# Patient Record
Sex: FEMALE | Race: WHITE | NOT HISPANIC OR LATINO | Employment: OTHER | URBAN - NONMETROPOLITAN AREA
[De-identification: names, ages, dates, MRNs, and addresses within clinical notes are randomized per-mention and may not be internally consistent; named-entity substitution may affect disease eponyms.]

---

## 2020-01-30 ENCOUNTER — APPOINTMENT (EMERGENCY)
Dept: RADIOLOGY | Facility: HOSPITAL | Age: 70
End: 2020-01-30
Payer: MEDICARE

## 2020-01-30 ENCOUNTER — HOSPITAL ENCOUNTER (EMERGENCY)
Facility: HOSPITAL | Age: 70
Discharge: HOME | End: 2020-01-30
Attending: EMERGENCY MEDICINE | Admitting: EMERGENCY MEDICINE
Payer: MEDICARE

## 2020-01-30 ENCOUNTER — APPOINTMENT (EMERGENCY)
Dept: CT IMAGING | Facility: HOSPITAL | Age: 70
End: 2020-01-30
Payer: MEDICARE

## 2020-01-30 VITALS
OXYGEN SATURATION: 99 % | SYSTOLIC BLOOD PRESSURE: 160 MMHG | RESPIRATION RATE: 18 BRPM | HEIGHT: 63 IN | WEIGHT: 202 LBS | HEART RATE: 85 BPM | TEMPERATURE: 97.8 F | DIASTOLIC BLOOD PRESSURE: 80 MMHG | BODY MASS INDEX: 35.79 KG/M2

## 2020-01-30 DIAGNOSIS — N39.0 UTI (URINARY TRACT INFECTION): ICD-10-CM

## 2020-01-30 DIAGNOSIS — M54.9 BACK PAIN: Primary | ICD-10-CM

## 2020-01-30 LAB
ALBUMIN SERPL BCP-MCNC: 3.5 G/DL (ref 3.5–5)
ALP SERPL-CCNC: 82 U/L (ref 46–116)
ALT SERPL W P-5'-P-CCNC: 22 U/L (ref 12–78)
ANION GAP SERPL CALCULATED.3IONS-SCNC: 9 MMOL/L (ref 4–13)
AST SERPL W P-5'-P-CCNC: 26 U/L (ref 5–45)
BACTERIA UR QL AUTO: ABNORMAL /HPF
BASOPHILS # BLD AUTO: 0.09 THOUSANDS/ΜL (ref 0–0.1)
BASOPHILS NFR BLD AUTO: 1 % (ref 0–1)
BILIRUB SERPL-MCNC: 0.54 MG/DL (ref 0.2–1)
BILIRUB UR QL STRIP: NEGATIVE
BUN SERPL-MCNC: 21 MG/DL (ref 5–25)
CALCIUM SERPL-MCNC: 8.6 MG/DL (ref 8.3–10.1)
CHLORIDE SERPL-SCNC: 103 MMOL/L (ref 100–108)
CLARITY UR: ABNORMAL
CO2 SERPL-SCNC: 26 MMOL/L (ref 21–32)
COLOR UR: YELLOW
CREAT SERPL-MCNC: 1.26 MG/DL (ref 0.6–1.3)
EOSINOPHIL # BLD AUTO: 0.43 THOUSAND/ΜL (ref 0–0.61)
EOSINOPHIL NFR BLD AUTO: 4 % (ref 0–6)
ERYTHROCYTE [DISTWIDTH] IN BLOOD BY AUTOMATED COUNT: 13.2 % (ref 11.6–15.1)
GFR SERPL CREATININE-BSD FRML MDRD: 44 ML/MIN/1.73SQ M
GLUCOSE SERPL-MCNC: 94 MG/DL (ref 65–140)
GLUCOSE UR STRIP-MCNC: NEGATIVE MG/DL
HCT VFR BLD AUTO: 43 % (ref 34.8–46.1)
HGB BLD-MCNC: 14.4 G/DL (ref 11.5–15.4)
HGB UR QL STRIP.AUTO: ABNORMAL
IMM GRANULOCYTES # BLD AUTO: 0.05 THOUSAND/UL (ref 0–0.2)
IMM GRANULOCYTES NFR BLD AUTO: 0 % (ref 0–2)
KETONES UR STRIP-MCNC: NEGATIVE MG/DL
LEUKOCYTE ESTERASE UR QL STRIP: ABNORMAL
LYMPHOCYTES # BLD AUTO: 3.17 THOUSANDS/ΜL (ref 0.6–4.47)
LYMPHOCYTES NFR BLD AUTO: 27 % (ref 14–44)
MCH RBC QN AUTO: 29.1 PG (ref 26.8–34.3)
MCHC RBC AUTO-ENTMCNC: 33.5 G/DL (ref 31.4–37.4)
MCV RBC AUTO: 87 FL (ref 82–98)
MONOCYTES # BLD AUTO: 0.93 THOUSAND/ΜL (ref 0.17–1.22)
MONOCYTES NFR BLD AUTO: 8 % (ref 4–12)
NEUTROPHILS # BLD AUTO: 7.05 THOUSANDS/ΜL (ref 1.85–7.62)
NEUTS SEG NFR BLD AUTO: 60 % (ref 43–75)
NITRITE UR QL STRIP: NEGATIVE
NON-SQ EPI CELLS URNS QL MICRO: ABNORMAL /HPF
NRBC BLD AUTO-RTO: 0 /100 WBCS
OTHER STN SPEC: ABNORMAL
PH UR STRIP.AUTO: 6 [PH]
PLATELET # BLD AUTO: 364 THOUSANDS/UL (ref 149–390)
PMV BLD AUTO: 10.1 FL (ref 8.9–12.7)
POTASSIUM SERPL-SCNC: 4.3 MMOL/L (ref 3.5–5.3)
PROT SERPL-MCNC: 8.5 G/DL (ref 6.4–8.2)
PROT UR STRIP-MCNC: NEGATIVE MG/DL
RBC # BLD AUTO: 4.94 MILLION/UL (ref 3.81–5.12)
RBC #/AREA URNS AUTO: ABNORMAL /HPF
SODIUM SERPL-SCNC: 138 MMOL/L (ref 136–145)
SP GR UR STRIP.AUTO: 1.01 (ref 1–1.03)
UROBILINOGEN UR QL STRIP.AUTO: 0.2 E.U./DL
WBC # BLD AUTO: 11.72 THOUSAND/UL (ref 4.31–10.16)
WBC #/AREA URNS AUTO: ABNORMAL /HPF

## 2020-01-30 PROCEDURE — 99284 EMERGENCY DEPT VISIT MOD MDM: CPT

## 2020-01-30 PROCEDURE — 87086 URINE CULTURE/COLONY COUNT: CPT | Performed by: EMERGENCY MEDICINE

## 2020-01-30 PROCEDURE — 81001 URINALYSIS AUTO W/SCOPE: CPT | Performed by: EMERGENCY MEDICINE

## 2020-01-30 PROCEDURE — 85025 COMPLETE CBC W/AUTO DIFF WBC: CPT | Performed by: EMERGENCY MEDICINE

## 2020-01-30 PROCEDURE — 96365 THER/PROPH/DIAG IV INF INIT: CPT

## 2020-01-30 PROCEDURE — 36415 COLL VENOUS BLD VENIPUNCTURE: CPT | Performed by: EMERGENCY MEDICINE

## 2020-01-30 PROCEDURE — 96361 HYDRATE IV INFUSION ADD-ON: CPT

## 2020-01-30 PROCEDURE — 74176 CT ABD & PELVIS W/O CONTRAST: CPT

## 2020-01-30 PROCEDURE — 99284 EMERGENCY DEPT VISIT MOD MDM: CPT | Performed by: EMERGENCY MEDICINE

## 2020-01-30 PROCEDURE — 71046 X-RAY EXAM CHEST 2 VIEWS: CPT

## 2020-01-30 PROCEDURE — 80053 COMPREHEN METABOLIC PANEL: CPT | Performed by: EMERGENCY MEDICINE

## 2020-01-30 PROCEDURE — 96375 TX/PRO/DX INJ NEW DRUG ADDON: CPT

## 2020-01-30 RX ORDER — ONDANSETRON 2 MG/ML
4 INJECTION INTRAMUSCULAR; INTRAVENOUS ONCE
Status: COMPLETED | OUTPATIENT
Start: 2020-01-30 | End: 2020-01-30

## 2020-01-30 RX ORDER — LISINOPRIL 5 MG/1
5 TABLET ORAL DAILY
COMMUNITY

## 2020-01-30 RX ORDER — CEFTRIAXONE 1 G/50ML
1000 INJECTION, SOLUTION INTRAVENOUS ONCE
Status: COMPLETED | OUTPATIENT
Start: 2020-01-30 | End: 2020-01-30

## 2020-01-30 RX ORDER — MORPHINE SULFATE 4 MG/ML
4 INJECTION, SOLUTION INTRAMUSCULAR; INTRAVENOUS ONCE
Status: COMPLETED | OUTPATIENT
Start: 2020-01-30 | End: 2020-01-30

## 2020-01-30 RX ORDER — CEFADROXIL 500 MG/1
500 CAPSULE ORAL EVERY 12 HOURS SCHEDULED
Qty: 20 CAPSULE | Refills: 0 | Status: SHIPPED | OUTPATIENT
Start: 2020-01-30 | End: 2020-02-09

## 2020-01-30 RX ORDER — OMEPRAZOLE 20 MG/1
20 CAPSULE, DELAYED RELEASE ORAL DAILY
COMMUNITY

## 2020-01-30 RX ORDER — KETOROLAC TROMETHAMINE 30 MG/ML
15 INJECTION, SOLUTION INTRAMUSCULAR; INTRAVENOUS ONCE
Status: COMPLETED | OUTPATIENT
Start: 2020-01-30 | End: 2020-01-30

## 2020-01-30 RX ADMIN — ONDANSETRON 4 MG: 2 INJECTION INTRAMUSCULAR; INTRAVENOUS at 21:44

## 2020-01-30 RX ADMIN — KETOROLAC TROMETHAMINE 15 MG: 30 INJECTION, SOLUTION INTRAMUSCULAR at 21:44

## 2020-01-30 RX ADMIN — CEFTRIAXONE 1000 MG: 1 INJECTION, SOLUTION INTRAVENOUS at 22:42

## 2020-01-30 RX ADMIN — MORPHINE SULFATE 4 MG: 4 INJECTION INTRAVENOUS at 22:56

## 2020-01-30 RX ADMIN — SODIUM CHLORIDE 1000 ML: 0.9 INJECTION, SOLUTION INTRAVENOUS at 21:47

## 2020-01-31 NOTE — ED PROVIDER NOTES
History  Chief Complaint   Patient presents with    Back Pain     right lower nadege pain, non-radiating ,nausea, started 3 am today, denies pain or burning with urination     Right flank discomfort 10 to weight bear around 3:00 a m  This morning and persisted, worse with movement      Flank Pain   Pain location:  R flank  Pain quality: aching    Pain radiates to:  Does not radiate  Pain severity:  Moderate  Timing:  Constant  Progression:  Unchanged  Chronicity:  New  Context: awakening from sleep    Context: not trauma    Worsened by: Movement  Associated symptoms: no chest pain, no chills, no diarrhea, no dysuria, no fatigue, no fever, no hematuria, no shortness of breath and no vomiting        Prior to Admission Medications   Prescriptions Last Dose Informant Patient Reported? Taking?   lisinopril (ZESTRIL) 5 mg tablet   Yes Yes   Sig: Take 5 mg by mouth daily   omeprazole (PriLOSEC) 20 mg delayed release capsule   Yes Yes   Sig: Take 20 mg by mouth daily      Facility-Administered Medications: None       Past Medical History:   Diagnosis Date    GERD (gastroesophageal reflux disease)     Hypertension        Past Surgical History:   Procedure Laterality Date    CHOLECYSTECTOMY         History reviewed  No pertinent family history  I have reviewed and agree with the history as documented  Social History     Tobacco Use    Smoking status: Never Smoker    Smokeless tobacco: Never Used   Substance Use Topics    Alcohol use: Never     Frequency: Never    Drug use: Never        Review of Systems   Constitutional: Negative for chills, fatigue and fever  Respiratory: Negative for shortness of breath  Cardiovascular: Negative for chest pain  Gastrointestinal: Negative for diarrhea and vomiting  Genitourinary: Positive for flank pain  Negative for dysuria and hematuria  All other systems reviewed and are negative        Physical Exam  Physical Exam   Constitutional: She is oriented to person, place, and time  She appears well-developed and well-nourished  HENT:   Head: Normocephalic and atraumatic  Mouth/Throat: Oropharynx is clear and moist    Eyes: Pupils are equal, round, and reactive to light  Conjunctivae and EOM are normal    Neck: Neck supple  Cardiovascular: Normal rate, regular rhythm and normal heart sounds  Pulmonary/Chest: Effort normal and breath sounds normal    Abdominal: Soft  Bowel sounds are normal  She exhibits no distension  There is no tenderness  Musculoskeletal: Normal range of motion  She exhibits no tenderness  Neurological: She is alert and oriented to person, place, and time  No cranial nerve deficit  Coordination normal    Skin: Skin is warm and dry  No rash noted  Psychiatric: She has a normal mood and affect  Her behavior is normal  Judgment and thought content normal    Nursing note and vitals reviewed        Vital Signs  ED Triage Vitals [01/30/20 2125]   Temperature Pulse Respirations Blood Pressure SpO2   98 °F (36 7 °C) 88 18 (!) 186/96 98 %      Temp Source Heart Rate Source Patient Position - Orthostatic VS BP Location FiO2 (%)   Oral Monitor Lying Right arm --      Pain Score       5           Vitals:    01/30/20 2125 01/30/20 2320   BP: (!) 186/96 160/80   Pulse: 88 85   Patient Position - Orthostatic VS: Lying Lying         Visual Acuity      ED Medications  Medications   sodium chloride 0 9 % bolus 1,000 mL (0 mL Intravenous Stopped 1/30/20 2319)   ondansetron (ZOFRAN) injection 4 mg (4 mg Intravenous Given 1/30/20 2144)   ketorolac (TORADOL) injection 15 mg (15 mg Intravenous Given 1/30/20 2144)   cefTRIAXone (ROCEPHIN) IVPB (premix) 1,000 mg (0 mg Intravenous Stopped 1/30/20 2319)   morphine (PF) 4 mg/mL injection 4 mg (4 mg Intravenous Given 1/30/20 2256)       Diagnostic Studies  Results Reviewed     Procedure Component Value Units Date/Time    Urine Microscopic [[id number]]  (Abnormal) Collected:  01/30/20 2147    Lab Status:  Final result Specimen: Urine, Clean Catch Updated:  01/30/20 2209     RBC, UA None Seen /hpf      WBC, UA Innumerable /hpf      Epithelial Cells Occasional /hpf      Bacteria, UA Moderate /hpf      OTHER OBSERVATIONS Glitter Cells    Urine culture [[id number]] Collected:  01/30/20 2147    Lab Status:   In process Specimen:  Urine, Clean Catch Updated:  01/30/20 2209    Comprehensive metabolic panel [[id number]]  (Abnormal) Collected:  01/30/20 2140    Lab Status:  Final result Specimen:  Blood from Arm, Right Updated:  01/30/20 2200     Sodium 138 mmol/L      Potassium 4 3 mmol/L      Chloride 103 mmol/L      CO2 26 mmol/L      ANION GAP 9 mmol/L      BUN 21 mg/dL      Creatinine 1 26 mg/dL      Glucose 94 mg/dL      Calcium 8 6 mg/dL      AST 26 U/L      ALT 22 U/L      Alkaline Phosphatase 82 U/L      Total Protein 8 5 g/dL      Albumin 3 5 g/dL      Total Bilirubin 0 54 mg/dL      eGFR 44 ml/min/1 73sq m     Narrative:       [place] guidelines for Chronic Kidney Disease (CKD):     Stage 1 with normal or high GFR (GFR > 90 mL/min/1 73 square meters)    Stage 2 Mild CKD (GFR = 60-89 mL/min/1 73 square meters)    Stage 3A Moderate CKD (GFR = 45-59 mL/min/1 73 square meters)    Stage 3B Moderate CKD (GFR = 30-44 mL/min/1 73 square meters)    Stage 4 Severe CKD (GFR = 15-29 mL/min/1 73 square meters)    Stage 5 End Stage CKD (GFR <15 mL/min/1 73 square meters)  Note: GFR calculation is accurate only with a steady state creatinine    UA w Reflex to Microscopic w Reflex to Culture [[id number]]  (Abnormal) Collected:  01/30/20 2147    Lab Status:  Final result Specimen:  Urine, Clean Catch Updated:  01/30/20 2156     Color, UA Yellow     Clarity, UA Slightly Cloudy     Specific Gravity, UA 1 015     pH, UA 6 0     Leukocytes, UA Moderate     Nitrite, UA Negative     Protein, UA Negative mg/dl      Glucose, UA Negative mg/dl      Ketones, UA Negative mg/dl      Urobilinogen, UA 0 2 E U /dl      Bilirubin, UA Negative     Blood, UA Trace-lysed    CBC and differential [[id number]]  (Abnormal) Collected:  01/30/20 2140    Lab Status:  Final result Specimen:  Blood from Arm, Right Updated:  01/30/20 2145     WBC 11 72 Thousand/uL      RBC 4 94 Million/uL      Hemoglobin 14 4 g/dL      Hematocrit 43 0 %      MCV 87 fL      MCH 29 1 pg      MCHC 33 5 g/dL      RDW 13 2 %      MPV 10 1 fL      Platelets 247 Thousands/uL      nRBC 0 /100 WBCs      Neutrophils Relative 60 %      Immat GRANS % 0 %      Lymphocytes Relative 27 %      Monocytes Relative 8 %      Eosinophils Relative 4 %      Basophils Relative 1 %      Neutrophils Absolute 7 05 Thousands/µL      Immature Grans Absolute 0 05 Thousand/uL      Lymphocytes Absolute 3 17 Thousands/µL      Monocytes Absolute 0 93 Thousand/µL      Eosinophils Absolute 0 43 Thousand/µL      Basophils Absolute 0 09 Thousands/µL                  CT abdomen pelvis wo contrast   Final Result by Jenni Scruggs MD (01/30 2232)      No acute findings in the abdomen or pelvis                 Workstation performed: [id number]         XR chest 2 views   ED Interpretation by Huma Weaver DO (01/30 2256)   NAD                 Procedures  Procedures         ED Course  ED Course as of Jan 31 0109   Thu Jan 30, 2020 2256 We discussed results, feeling better, agreeable with close outpatient follow up, will return immediately if worse or any symptoms, spouse present                                  MDM      Disposition  Final diagnoses:   Back pain   UTI (urinary tract infection)     Time reflects when diagnosis was documented in both MDM as applicable and the Disposition within this note     Time User Action Codes Description Comment    1/30/2020 10:56 PM Ursula Rosin Add [M54 9] Back pain     1/30/2020 10:56 PM Ursula Rosin Add [N39 0] UTI (urinary tract infection)       ED Disposition     ED Disposition Condition Date/Time Comment    Discharge Stable Thu Jan 30, 2020 10:56 PM Brayden Dunlap Bridges discharge to home/self care  Follow-up Information     Follow up With Specialties Details Why Contact Info    Oswaldo Mullins DO  Schedule an appointment as soon as possible for a visit in 3 days  [address] [facility]  [phone]            Discharge Medication List as of 2020 10:57 PM      START taking these medications    Details   cefadroxil (DURICEF) 500 mg capsule Take 1 capsule (500 mg total) by mouth every 12 (twelve) hours for 10 days, Starting Thu 2020, Until Sun 2020, Normal         CONTINUE these medications which have NOT CHANGED    Details   lisinopril (ZESTRIL) 5 mg tablet Take 5 mg by mouth daily, Historical Med      omeprazole (PriLOSEC) 20 mg delayed release capsule Take 20 mg by mouth daily, Historical Med           No discharge procedures on file      ED Provider  Electronically Signed by           Charla Wolff DO  [id number]

## 2020-01-31 NOTE — DISCHARGE INSTRUCTIONS
Return immediately if worse or any new symptoms  Tylenol 1000 mg every 6 hours as needed  and/or  Advil 400 mg every 6 hours as needed  May take both together

## 2020-01-31 NOTE — ED NOTES
Patient verbalized understanding of discharge instructions and states she has no further questions      Vito Huynh RN  01/30/20 2084

## 2020-02-01 LAB — BACTERIA UR CULT: NORMAL

## 2021-09-21 ENCOUNTER — HOSPITAL ENCOUNTER (OUTPATIENT)
Dept: RADIOLOGY | Facility: HOSPITAL | Age: 71
Discharge: HOME | End: 2021-09-21
Payer: MEDICARE

## 2021-09-21 DIAGNOSIS — R05.9 COUGH: ICD-10-CM

## 2021-09-21 PROCEDURE — 71046 X-RAY EXAM CHEST 2 VIEWS: CPT

## 2024-08-28 ENCOUNTER — HOSPITAL ENCOUNTER (OUTPATIENT)
Dept: RADIOLOGY | Facility: HOSPITAL | Age: 74
Discharge: HOME | End: 2024-08-28
Payer: MEDICARE

## 2024-08-28 DIAGNOSIS — R05.9 COUGH, UNSPECIFIED TYPE: ICD-10-CM

## 2024-08-28 PROCEDURE — 71046 X-RAY EXAM CHEST 2 VIEWS: CPT

## 2024-12-04 ENCOUNTER — TELEPHONE (OUTPATIENT)
Dept: PULMONOLOGY | Facility: CLINIC | Age: 74
End: 2024-12-04

## 2024-12-17 ENCOUNTER — TRANSCRIBE ORDERS (OUTPATIENT)
Dept: SLEEP CENTER | Facility: CLINIC | Age: 74
End: 2024-12-17

## 2024-12-17 ENCOUNTER — OFFICE VISIT (OUTPATIENT)
Dept: PULMONOLOGY | Facility: CLINIC | Age: 74
End: 2024-12-17
Payer: MEDICARE

## 2024-12-17 VITALS
HEART RATE: 93 BPM | WEIGHT: 224 LBS | OXYGEN SATURATION: 97 % | BODY MASS INDEX: 39.68 KG/M2 | DIASTOLIC BLOOD PRESSURE: 84 MMHG | SYSTOLIC BLOOD PRESSURE: 148 MMHG | TEMPERATURE: 97.3 F

## 2024-12-17 DIAGNOSIS — I10 PRIMARY HYPERTENSION: ICD-10-CM

## 2024-12-17 DIAGNOSIS — R05.3 CHRONIC COUGH: Primary | ICD-10-CM

## 2024-12-17 DIAGNOSIS — Z91.89 AT RISK FOR OBSTRUCTIVE SLEEP APNEA: ICD-10-CM

## 2024-12-17 DIAGNOSIS — K21.9 GASTROESOPHAGEAL REFLUX DISEASE WITHOUT ESOPHAGITIS: ICD-10-CM

## 2024-12-17 DIAGNOSIS — R05.8 UPPER AIRWAY COUGH SYNDROME: ICD-10-CM

## 2024-12-17 DIAGNOSIS — R05.3 CHRONIC COUGH: ICD-10-CM

## 2024-12-17 DIAGNOSIS — G47.8 OTHER SLEEP DISORDERS: Primary | ICD-10-CM

## 2024-12-17 DIAGNOSIS — R06.81 WITNESSED EPISODE OF APNEA: ICD-10-CM

## 2024-12-17 PROCEDURE — 95012 NITRIC OXIDE EXP GAS DETER: CPT | Performed by: INTERNAL MEDICINE

## 2024-12-17 PROCEDURE — 99205 OFFICE O/P NEW HI 60 MIN: CPT | Performed by: INTERNAL MEDICINE

## 2024-12-17 RX ORDER — FAMOTIDINE 40 MG/1
40 TABLET, FILM COATED ORAL
COMMUNITY

## 2024-12-17 RX ORDER — FLUTICASONE PROPIONATE 50 MCG
2 SPRAY, SUSPENSION (ML) NASAL DAILY
COMMUNITY
Start: 2024-09-20

## 2024-12-17 RX ORDER — FLUTICASONE PROPIONATE AND SALMETEROL 100; 50 UG/1; UG/1
1 POWDER RESPIRATORY (INHALATION) 2 TIMES DAILY
Qty: 180 BLISTER | Refills: 0 | Status: SHIPPED | OUTPATIENT
Start: 2024-12-17 | End: 2024-12-19 | Stop reason: SDUPTHER

## 2024-12-17 RX ORDER — LOSARTAN POTASSIUM 25 MG/1
1 TABLET ORAL DAILY
COMMUNITY
Start: 2024-10-26

## 2024-12-17 NOTE — PROGRESS NOTES
Attending Statement:  I have seen and examined the patient. I have discussed the patient's care with the pulmonary fellow.    I agree with the findings, assessment, and plan as outlined in the note by Dr Bailon with the addition of the following:    Imaging Studies were reviewed personally on the PAC system:   CXR 8/2024: clear lungs    EXAMINATION:   CTAB     A/P:  74F hx HTN, GERD, presenting for evaluation of cough    # Chronic cough  # GERD  # Chronic rhinitis  Cough over a year. Normal CXR, no prior lung disease. Likely upper airway cough syndrome due to chronic rhinitis  Prior lisinopril changed to losartan a year ago  Per ENT     - continue PPI  - continue flonase, needs to use consistently  --- nettipot  - FeNO 33, intermediate, trial of ics    # ELIZABETH  Stop bang 7, witnessed apneas, mildly sleepy  - sleep study    Francheska Mix MD  [facility]

## 2024-12-17 NOTE — PROGRESS NOTES
Pulmonary Consultation  Name: Jessenia Bridges      : 1950      MRN: [id number]  Encounter Provider: Mookie Arvizu DO  Encounter Date: 2024   Encounter department: [facility] [place]  :  Reason for Consultation:  chronic cough    Requested by:    No referring provider defined for this encounter.    Assessment & Plan  Chronic cough  Likely due to upper airway cough syndrome  Seen by ENT:  Currently on losartan after being switched from lisinopril over a year ago.  No history of smoking.  Eos: 430  FeNO: 33 in office  Chest x-ray 2024:no acute cardiopulmonary disease       At risk for obstructive sleep apnea  STOP BAN  2D echo ([facility], 2024): LV EF 55-60%, mild diastolic dysfunction.  No tobacco use.  Orders:    Ambulatory Referral to Sleep Medicine; Future    Witnessed episode of apnea  By         Upper airway cough syndrome         Gastroesophageal reflux disease without esophagitis  On omeprazole       Primary hypertension           Plan:  Return to pulmonary clinic in 4 months  Continue with losartan rather than lisopril  Neti Pot with distilled water and salt at night  Flonase spray during the day  FeNO in office 33  Will trial Advair 100-50 mcg.  Reviewed inhaler technique with patient  Home sleep study  Lifestyle modifications namely weight loss  May consider PFT in future if no improvement in symptoms.       History of Present Illness   HPI    History of Present Illness   HPI:  Jessenia Bridges is a 74 y.o. female with PMHx of HTN (on lisinopril since ), chronic rhinitis/UACS, GERD who presents to the pulmonary clinic for chronic cough which has been going on for few years but worsened over the last year.  Patient describes the chronic cough as productive, persistent, associated with frequent throat clearing, but not with fever, chills, chest pain, hemoptysis, hoarse voice.  Symptoms are worse when laying on her back.  She notes having tried Flonase in  the past, but used it as needed rather than on a consistent basis, and so has not experienced any benefit from Flonase.  She is currently taking omeprazole for gastric reflux, and has not had any breakthrough symptoms of acidic taste in her mouth, or gastric reflux.  Patient notes that she did take lisinopril, but was switched over to losartan about 1 year ago.  She denies any smoking history, or history of childhood asthma, or using inhalers as a child.     Additionally, the  who is with patient notes that the patient has apneic episodes that wake the patient up.  He also notes that she snores very loudly.  Despite this, patient is not very sleepy during the day and had an Hamburg score of 6 today.    Sleep History:  she goes to bed at approximately 1230AM, will get to sleep in 15 min, will get out of bed at 845 am.  she will get up 1-2 times at night.  It will then take 20 minutes to fall back asleep.  she does nap x1 during he day.  The naps are 45 min in duration and are refreshing.      Currently the patient is using sometimes  to help them fall asleep.    Hamburg score: 6  Sitting and readin   Watching TV: 3  Sitting inactive in a public place: 0  As a passenger of a car for an hour without a break: 0  Lying down to rest in the afternoon when circumstances permit: 3  Sitting quietly after lunch without alcohol: 0  In a car, while stopped for a few minutes in traffic:: 0      STOP-BAN  Snore loudly? 1  Often feels tired, fatigued, or sleepy during the day: 1  Apnea has been observed by others: 1  On antihypertensives: 1  BMI >35: 1  Age >50: 1  Neck circumference: 17in  Gender: 0 (male= 1)    Review of Systems   Constitutional:  Negative for diaphoresis and fever.   HENT:  Positive for postnasal drip and rhinorrhea. Negative for sinus pressure and sinus pain.    Eyes:  Negative for photophobia and discharge.   Respiratory:  Positive for cough (Productive cough). Negative for shortness of breath.     Musculoskeletal:  Negative for back pain and myalgias.   Allergic/Immunologic: Positive for environmental allergies.   Neurological:  Negative for dizziness and speech difficulty.   Psychiatric/Behavioral:  Negative for agitation.        Preventive   Most Recent Immunizations   Administered Date(s) Administered    COVID-19 MODERNA VACC 0.5 ML IM 03/15/2021    COVID-19 Moderna mRNA Vaccine 12 Yr+ 50 mcg/0.5 mL (Spikevax) 10/25/2023    COVID-19 PFIZER VACCINE 0.3 ML IM 12/20/2021     Lung Cancer screening: never smoker    Historical Information   Past Medical History:   Diagnosis Date    GERD (gastroesophageal reflux disease)     Hypertension      Past Surgical History:   Procedure Laterality Date    CHOLECYSTECTOMY       No family history on file.    Social History:   Social History     Tobacco Use   Smoking Status Never   Smokeless Tobacco Never       Meds/Allergies     Current Outpatient Medications:     lisinopril (ZESTRIL) 5 mg tablet, Take 5 mg by mouth daily, Disp: , Rfl:     omeprazole (PriLOSEC) 20 mg delayed release capsule, Take 20 mg by mouth daily, Disp: , Rfl:   No Known Allergies    Vitals: There were no vitals taken for this visit., There is no height or weight on file to calculate BMI.      Physical Exam  Physical Exam  HENT:      Right Ear: Tympanic membrane normal.      Left Ear: Tympanic membrane normal.      Mouth/Throat:      Mouth: Mucous membranes are moist.      Comments: Mallampati 3  Eyes:      Pupils: Pupils are equal, round, and reactive to light.   Cardiovascular:      Rate and Rhythm: Normal rate and regular rhythm.   Pulmonary:      Effort: Pulmonary effort is normal. No respiratory distress.      Breath sounds: Normal breath sounds. No stridor. No wheezing, rhonchi or rales.   Chest:      Chest wall: No tenderness.   Genitourinary:     General: Normal vulva.   Musculoskeletal:         General: Normal range of motion.      Cervical back: Normal range of motion.      Right lower leg:  "Edema present.      Left lower leg: Edema present.   Skin:     General: Skin is warm.      Capillary Refill: Capillary refill takes less than 2 seconds.   Neurological:      General: No focal deficit present.      Mental Status: She is alert and oriented to person, place, and time.   Psychiatric:         Mood and Affect: Mood normal.         Labs: I have personally reviewed pertinent lab results.  Lab Results   Component Value Date    WBC 11.72 (H) 01/30/2020    HGB 14.4 01/30/2020    HCT 43.0 01/30/2020    MCV 87 01/30/2020     01/30/2020     Lab Results   Component Value Date    CALCIUM 8.6 01/30/2020    K 4.3 01/30/2020    CO2 26 01/30/2020     01/30/2020    BUN 21 01/30/2020    CREATININE 1.26 01/30/2020     No results found for: \"IGE\"  Lab Results   Component Value Date    ALT 22 01/30/2020    AST 26 01/30/2020    ALKPHOS 82 01/30/2020       Imaging and other studies: Results Review Statement: I personally reviewed the following image studies in PACS and associated radiology reports: chest xray and Echocardiogram. My interpretation of the radiology images/reports is: as above.  XR chest pa & lateral  Result Date: 8/28/2024  Impression: No acute cardiopulmonary disease. No change from 9/21/2021 Electronically signed: 08/28/2024 04:33 PM Karlos Galindo MD      Pulmonary function testing: no previous     EKG, Pathology, and Other Studies: Results Review Statement: I personally reviewed the following image studies in PACS and associated radiology reports: chest xray and Echocardiogram. My interpretation of the radiology images/reports is: As above.    Disclaimer: Portions of the record may have been created with voice recognition software. Occasional wrong word or \"sound a like\" substitutions may have occurred due to the inherent limitations of voice recognition software. Careful consideration should be taken to recognize, using context, where substitutions have occurred.    Mookie Bailon DO "   Pulmonary & Critical Care Medicine Fellow PGY-V  [facility]

## 2024-12-19 ENCOUNTER — TELEPHONE (OUTPATIENT)
Age: 74
End: 2024-12-19

## 2024-12-19 DIAGNOSIS — R05.3 CHRONIC COUGH: ICD-10-CM

## 2024-12-19 DIAGNOSIS — R05.8 UPPER AIRWAY COUGH SYNDROME: ICD-10-CM

## 2024-12-19 RX ORDER — FLUTICASONE PROPIONATE AND SALMETEROL 100; 50 UG/1; UG/1
1 POWDER RESPIRATORY (INHALATION) 2 TIMES DAILY
Qty: 180 BLISTER | Refills: 8 | Status: SHIPPED | OUTPATIENT
Start: 2024-12-19 | End: 2025-03-19

## 2024-12-19 NOTE — TELEPHONE ENCOUNTER
Can you please asked patient to call insurance company and see what is covered under their plan so we can expedite a cheaper inhaler-please ask her if she would like to wait to call her insurance company and I can order the preferred medication or she would like me to order the Advair now regardless

## 2024-12-19 NOTE — TELEPHONE ENCOUNTER
Patients  called in on behalf of the patient. He states that the Advair diskus inhaler is too expensive and he is wondering if there are any alternatives that may be cheaper. In the meantime, he is asking for a 90 day supply of the Advair to be sent to the [facility] pharmacy on file. Please advise.

## 2025-02-21 RX ORDER — LISINOPRIL 5 MG/1
TABLET ORAL
COMMUNITY
End: 2025-03-03

## 2025-02-23 NOTE — PROGRESS NOTES
Pulmonary Follow Up Note   Jessenia Bridges 74 y.o. female MRN: [id number]  2/24/2025    Assessment:  Chronic cough  Noted approximately mid 2024  A/W wheezing, minimally productive  A/W chronic rhinitis symptoms, known to have acid reflux  FeNO was 33 intermediate in 12/17  Started on Advair 100/Diskus at that time with complete improvement of symptoms  DDx UA CS/reversible airway disease/asthma, vs postviral bronchitis, or related to acid reflux also noted improvement of symptoms with increasing omeprazole to twice a day  No known environmental triggers, no Hx of asthma or nocturnal symptoms, negative family history of asthma  Did not use the elevator last night/over this a.m., feeling the same without recurrence of symptoms    Plan:  Okay to remain off the Advair for now, if had recurrence of wheezing/cough we will start using it again  Counseled about antireflux measures  Follow-up clinically in 3 months    Positive STOP-BANG  + Chronic loud snoring, witnessed apnea and daytime sleepiness  Not interested in sleep study at this point, will rediscuss at next visit        Return in about 3 months (around 5/24/2025).    History of Present Illness     Follow up for: Chronic cough    Background:  74 y.o. female with a h/o acid reflux, HTN,    Initially seen by pulmonary in 12/2024 for a chronic cough, used to be on lisinopril in 2020, reported chronic rhinitis.  Cough was persistent for about 1 year, productive, associated with throat clearing's no fever, chills or hemoptysis no change in voice.  FeNO was 33/intermediate, tried on ICS/Advair 100, peripheral eosinophilia 430 cells.  Chest x-ray with no acute infiltrates.  Positive STOP-BANG screening, ordered home sleep study.    Interval History  Since last seen, felt a lot better with the Advair 100 Diskus, minimal wheezing, cough.  Intermittently using the nasal spray/Flonase and Ocean spray as well.  At the time of sleep study, she returned the equipments without  "performing the test, states that she is not ready for it yet.      Review of Systems  As per hpi, all other systems reviewed and were negative    Studies:    Imaging and other studies: I have personally reviewed pertinent films in PACS      Pulmonary function testing:       EKG, Pathology, and Other Studies: I have personally reviewed pertinent reports.        Past medical, surgical, social and family histories reviewed.      Medications/Allergies: Reviewed      Vitals: Blood pressure 138/78, pulse 94, temperature (!) 96.9 °F (36.1 °C), temperature source Temporal, height 5' 3\" (1.6 m), weight 103 kg (226 lb), SpO2 96%. Body mass index is 40.03 kg/m². Oxygen Therapy  SpO2: 96 %      Physical Exam  Body mass index is 40.03 kg/m².   Gen: not in acute distress,   Neck/Eyes: supple, moderate thoracic kyphosis, PERRL  Ear: normal appearance, no significant hearing impairment  Nose:  normal nasal mucosa, no drainage  Mouth:  unremarkable/normal appearance of lips, teeth and gums  Oropharynx: mucosa is moist, no focal lesions or erythema  Salivary glands: soft nontender  Chest: normal respiratory efforts, clear breath sounds bilaterally  CV: RRR, no murmurs appreciated, no edema  Abdomen: soft, non tender  Extremities:  No observed deformity   Skin: unremarkable  Neuro: AAO X3, no focal motor deficit        Labs:  Lab Results   Component Value Date    WBC 11.72 (H) 01/30/2020    HGB 14.4 01/30/2020    HCT 43.0 01/30/2020    MCV 87 01/30/2020     01/30/2020     Lab Results   Component Value Date    CALCIUM 8.6 01/30/2020    K 4.3 01/30/2020    CO2 26 01/30/2020     01/30/2020    BUN 21 01/30/2020    CREATININE 1.26 01/30/2020     No results found for: \"IGE\"  Lab Results   Component Value Date    ALT 22 01/30/2020    AST 26 01/30/2020    ALKPHOS 82 01/30/2020           Portions of the record may have been created with voice recognition software.  Occasional wrong word or \"sound a like\" substitutions may have " occurred due to the inherent limitations of voice recognition software.  Read the chart carefully and recognize, using context, where substitutions have occurred    Sal Gibbs M.D.  [facility]

## 2025-02-24 ENCOUNTER — OFFICE VISIT (OUTPATIENT)
Dept: PULMONOLOGY | Facility: CLINIC | Age: 75
End: 2025-02-24
Payer: MEDICARE

## 2025-02-24 VITALS
WEIGHT: 226 LBS | OXYGEN SATURATION: 96 % | BODY MASS INDEX: 40.04 KG/M2 | SYSTOLIC BLOOD PRESSURE: 138 MMHG | DIASTOLIC BLOOD PRESSURE: 78 MMHG | HEIGHT: 63 IN | TEMPERATURE: 96.9 F | HEART RATE: 94 BPM

## 2025-02-24 DIAGNOSIS — R05.3 CHRONIC COUGH: Primary | ICD-10-CM

## 2025-02-24 DIAGNOSIS — R06.81 WITNESSED EPISODE OF APNEA: ICD-10-CM

## 2025-02-24 PROCEDURE — 99214 OFFICE O/P EST MOD 30 MIN: CPT | Performed by: INTERNAL MEDICINE

## 2025-03-03 ENCOUNTER — OFFICE VISIT (OUTPATIENT)
Dept: FAMILY MEDICINE CLINIC | Facility: CLINIC | Age: 75
End: 2025-03-03
Payer: MEDICARE

## 2025-03-03 VITALS
WEIGHT: 223.11 LBS | HEART RATE: 86 BPM | SYSTOLIC BLOOD PRESSURE: 128 MMHG | DIASTOLIC BLOOD PRESSURE: 80 MMHG | OXYGEN SATURATION: 98 % | BODY MASS INDEX: 39.53 KG/M2 | HEIGHT: 63 IN

## 2025-03-03 DIAGNOSIS — Z12.11 SCREENING FOR COLON CANCER: ICD-10-CM

## 2025-03-03 DIAGNOSIS — R73.03 PREDIABETES: ICD-10-CM

## 2025-03-03 DIAGNOSIS — I10 PRIMARY HYPERTENSION: ICD-10-CM

## 2025-03-03 DIAGNOSIS — Z87.898 HISTORY OF SOLITARY PULMONARY NODULE: ICD-10-CM

## 2025-03-03 DIAGNOSIS — E78.2 MIXED HYPERLIPIDEMIA: Primary | ICD-10-CM

## 2025-03-03 DIAGNOSIS — Z11.59 NEED FOR HEPATITIS C SCREENING TEST: ICD-10-CM

## 2025-03-03 DIAGNOSIS — K21.9 GASTROESOPHAGEAL REFLUX DISEASE, UNSPECIFIED WHETHER ESOPHAGITIS PRESENT: ICD-10-CM

## 2025-03-03 DIAGNOSIS — M15.0 PRIMARY OSTEOARTHRITIS INVOLVING MULTIPLE JOINTS: ICD-10-CM

## 2025-03-03 DIAGNOSIS — Z86.39 HISTORY OF THYROID NODULE: ICD-10-CM

## 2025-03-03 PROCEDURE — 99204 OFFICE O/P NEW MOD 45 MIN: CPT | Performed by: FAMILY MEDICINE

## 2025-03-03 NOTE — ASSESSMENT & PLAN NOTE
A1C 5.8  Diet controlled  Continue to monitor     Orders:    CBC and differential; Future    Comprehensive metabolic panel; Future    Hemoglobin A1C; Future

## 2025-03-03 NOTE — ASSESSMENT & PLAN NOTE
Chronic, stable  Continue losartan 25mg daily  Echo completed 4/2024 - repeat q2 years  Continue to monitor     Orders:    CBC and differential; Future    Comprehensive metabolic panel; Future

## 2025-03-03 NOTE — ASSESSMENT & PLAN NOTE
Chronic, stable  Diet controlled  Continue to monitor    Orders:    CBC and differential; Future    Comprehensive metabolic panel; Future    Lipid panel; Future

## 2025-03-03 NOTE — PROGRESS NOTES
Name: Jessenia Bridges      : 1950      MRN: [id number]  Encounter Provider: Niurka Pickard DO  Encounter Date: 3/3/2025   Encounter department: [facility]  :  Assessment & Plan  Mixed hyperlipidemia  Chronic, stable  Diet controlled  Continue to monitor    Orders:    CBC and differential; Future    Comprehensive metabolic panel; Future    Lipid panel; Future    Primary hypertension  Chronic, stable  Continue losartan 25mg daily  Echo completed 2024 - repeat q2 years  Continue to monitor     Orders:    CBC and differential; Future    Comprehensive metabolic panel; Future    Prediabetes  A1C 5.8  Diet controlled  Continue to monitor     Orders:    CBC and differential; Future    Comprehensive metabolic panel; Future    Hemoglobin A1C; Future    Primary osteoarthritis involving multiple joints  Chronic, continue prn aleve/tylenol  Continue to monitor          Gastroesophageal reflux disease, unspecified whether esophagitis present  Chronic, stable  Continue ppi         History of thyroid nodule  Noted by prior PCP, was monitored for 2 years and stable, no further monitoring recommended       History of solitary pulmonary nodule  Noted by prior PCP, was monitored for 3 years and stable, no further monitoring recommended       Need for hepatitis C screening test    Orders:    Hepatitis C Antibody; Future    Screening for colon cancer    Orders:    Cologuard      Return in about 6 months (around 9/3/2025) for AWV, follow up chronic health conditions .      Depression Screening and Follow-up Plan: Patient was screened for depression during today's encounter. They screened negative with a PHQ-2 score of 0.        History of Present Illness   Chief Complaint   Patient presents with    New Patient Visit     Last pcp -Dr Pathak -6 months ago, no colonoscopy lately, had dexa scan done - not sure when -brought records      Prior PCP: Dr. Pathak last visit 2024   PMH: HTN,  "OA, GERD, HLD, prediabetes, history of thyroid nodule that was stable on imaging, pulm nodule stable for 3 years, echocardiogram was monitored every 2 years last was in 4/2024   SurgHx: cholecystectomy   Allergies: None   Medications: losartan, omeprazole   SocialHx:   Tobacco: none   Alcohol: None    Relationship:    Career: Retired    Specialist: ENT for chronic nasal congestion ([place]), Eye - Dr. Osorio, pulm at [facility],   Labs: due for CBC, CMP, Lipid, A1c  Screening: mammo normal 4/2024, Colnoscopy 10 years ago but interested in cologuard now, normal DEXA 4/2015 normal,     HPI    Review of Systems   Constitutional:  Negative for appetite change, chills, diaphoresis, fever and unexpected weight change.   Eyes:  Negative for visual disturbance.   Respiratory:  Negative for shortness of breath.    Cardiovascular:  Negative for chest pain and leg swelling.   Gastrointestinal:  Negative for constipation and diarrhea.   Endocrine: Negative for polydipsia and polyuria.   Genitourinary:  Negative for frequency.   Musculoskeletal:  Positive for back pain.   Neurological:  Negative for dizziness, light-headedness and headaches.       Objective   /80 (BP Location: Right arm, Patient Position: Sitting, Cuff Size: Large)   Pulse 86   Ht 5' 3\" (1.6 m)   Wt 101 kg (223 lb 1.7 oz)   SpO2 98%   BMI 39.52 kg/m²      Physical Exam  Vitals reviewed.   Constitutional:       General: She is not in acute distress.     Appearance: She is obese. She is not ill-appearing.   Cardiovascular:      Rate and Rhythm: Normal rate.   Pulmonary:      Effort: Pulmonary effort is normal.   Neurological:      General: No focal deficit present.      Mental Status: She is alert.   Psychiatric:         Mood and Affect: Mood normal.         Behavior: Behavior normal.         "

## 2025-03-11 LAB — COLOGUARD RESULT REPORTABLE: NEGATIVE

## 2025-03-12 ENCOUNTER — APPOINTMENT (OUTPATIENT)
Dept: LAB | Facility: CLINIC | Age: 75
End: 2025-03-12
Payer: MEDICARE

## 2025-03-12 ENCOUNTER — RESULTS FOLLOW-UP (OUTPATIENT)
Dept: FAMILY MEDICINE CLINIC | Facility: CLINIC | Age: 75
End: 2025-03-12

## 2025-03-12 DIAGNOSIS — Z11.59 NEED FOR HEPATITIS C SCREENING TEST: ICD-10-CM

## 2025-03-12 DIAGNOSIS — R73.03 PREDIABETES: ICD-10-CM

## 2025-03-12 DIAGNOSIS — E78.2 MIXED HYPERLIPIDEMIA: ICD-10-CM

## 2025-03-12 DIAGNOSIS — I10 PRIMARY HYPERTENSION: ICD-10-CM

## 2025-03-12 LAB
ALBUMIN SERPL BCG-MCNC: 4.2 G/DL (ref 3.5–5)
ALP SERPL-CCNC: 78 U/L (ref 34–104)
ALT SERPL W P-5'-P-CCNC: 16 U/L (ref 7–52)
ANION GAP SERPL CALCULATED.3IONS-SCNC: 10 MMOL/L (ref 4–13)
AST SERPL W P-5'-P-CCNC: 20 U/L (ref 13–39)
BASOPHILS # BLD AUTO: 0.07 THOUSANDS/ÂΜL (ref 0–0.1)
BASOPHILS NFR BLD AUTO: 1 % (ref 0–1)
BILIRUB SERPL-MCNC: 0.41 MG/DL (ref 0.2–1)
BUN SERPL-MCNC: 19 MG/DL (ref 5–25)
CALCIUM SERPL-MCNC: 9.8 MG/DL (ref 8.4–10.2)
CHLORIDE SERPL-SCNC: 103 MMOL/L (ref 96–108)
CHOLEST SERPL-MCNC: 181 MG/DL (ref ?–200)
CO2 SERPL-SCNC: 26 MMOL/L (ref 21–32)
CREAT SERPL-MCNC: 1.1 MG/DL (ref 0.6–1.3)
EOSINOPHIL # BLD AUTO: 0.64 THOUSAND/ÂΜL (ref 0–0.61)
EOSINOPHIL NFR BLD AUTO: 9 % (ref 0–6)
ERYTHROCYTE [DISTWIDTH] IN BLOOD BY AUTOMATED COUNT: 13.3 % (ref 11.6–15.1)
GFR SERPL CREATININE-BSD FRML MDRD: 50 ML/MIN/1.73SQ M
GLUCOSE P FAST SERPL-MCNC: 83 MG/DL (ref 65–99)
HCT VFR BLD AUTO: 44.8 % (ref 34.8–46.1)
HDLC SERPL-MCNC: 47 MG/DL
HGB BLD-MCNC: 14.5 G/DL (ref 11.5–15.4)
IMM GRANULOCYTES # BLD AUTO: 0.03 THOUSAND/UL (ref 0–0.2)
IMM GRANULOCYTES NFR BLD AUTO: 0 % (ref 0–2)
LDLC SERPL CALC-MCNC: 92 MG/DL (ref 0–100)
LYMPHOCYTES # BLD AUTO: 2.48 THOUSANDS/ÂΜL (ref 0.6–4.47)
LYMPHOCYTES NFR BLD AUTO: 33 % (ref 14–44)
MCH RBC QN AUTO: 29.4 PG (ref 26.8–34.3)
MCHC RBC AUTO-ENTMCNC: 32.4 G/DL (ref 31.4–37.4)
MCV RBC AUTO: 91 FL (ref 82–98)
MONOCYTES # BLD AUTO: 0.62 THOUSAND/ÂΜL (ref 0.17–1.22)
MONOCYTES NFR BLD AUTO: 8 % (ref 4–12)
NEUTROPHILS # BLD AUTO: 3.63 THOUSANDS/ÂΜL (ref 1.85–7.62)
NEUTS SEG NFR BLD AUTO: 49 % (ref 43–75)
NONHDLC SERPL-MCNC: 134 MG/DL
NRBC BLD AUTO-RTO: 0 /100 WBCS
PLATELET # BLD AUTO: 330 THOUSANDS/UL (ref 149–390)
PMV BLD AUTO: 10.8 FL (ref 8.9–12.7)
POTASSIUM SERPL-SCNC: 4.3 MMOL/L (ref 3.5–5.3)
PROT SERPL-MCNC: 7.8 G/DL (ref 6.4–8.4)
RBC # BLD AUTO: 4.93 MILLION/UL (ref 3.81–5.12)
SODIUM SERPL-SCNC: 139 MMOL/L (ref 135–147)
TRIGL SERPL-MCNC: 209 MG/DL (ref ?–150)
WBC # BLD AUTO: 7.47 THOUSAND/UL (ref 4.31–10.16)

## 2025-03-12 PROCEDURE — 80053 COMPREHEN METABOLIC PANEL: CPT

## 2025-03-12 PROCEDURE — 86803 HEPATITIS C AB TEST: CPT

## 2025-03-12 PROCEDURE — 80061 LIPID PANEL: CPT

## 2025-03-12 PROCEDURE — 36415 COLL VENOUS BLD VENIPUNCTURE: CPT

## 2025-03-12 PROCEDURE — 85025 COMPLETE CBC W/AUTO DIFF WBC: CPT

## 2025-03-12 PROCEDURE — 83036 HEMOGLOBIN GLYCOSYLATED A1C: CPT

## 2025-03-13 LAB
EST. AVERAGE GLUCOSE BLD GHB EST-MCNC: 120 MG/DL
HBA1C MFR BLD: 5.8 %
HCV AB SER QL: NORMAL

## 2025-03-24 ENCOUNTER — TELEPHONE (OUTPATIENT)
Age: 75
End: 2025-03-24

## 2025-03-24 NOTE — TELEPHONE ENCOUNTER
"Attempted to reach Jessenia and her  to relay the message per Dr. Pickard \"Labs are stable, continue to encourage healthy diet and regular exercise. Encourage low fat diet for elevated triglycerides. Will continue to monitor every year. Please let me know if any questions or concerns.\" Left a VM.  "

## 2025-03-24 NOTE — TELEPHONE ENCOUNTER
Patients  called for lab work results. Patients  was advised the labs have not been reviewed at this time. Please review lab work and advised patient of the results via phone at [phone].    Patient would also like copies of the results mailed to home address.

## 2025-03-24 NOTE — TELEPHONE ENCOUNTER
----- Message from Niurka Pickard DO sent at 3/24/2025 11:12 AM EDT -----  Labs are stable, continue to encourage healthy diet and regular exercise. Encourage low fat diet for elevated triglycerides. Will continue to monitor every year. Please let me know if any questions or concerns.

## 2025-04-04 ENCOUNTER — HOSPITAL ENCOUNTER (EMERGENCY)
Facility: HOSPITAL | Age: 75
Discharge: HOME | End: 2025-04-05
Attending: EMERGENCY MEDICINE
Payer: MEDICARE

## 2025-04-04 DIAGNOSIS — J40 BRONCHITIS: ICD-10-CM

## 2025-04-04 DIAGNOSIS — R05.1 ACUTE COUGH: Primary | ICD-10-CM

## 2025-04-04 PROCEDURE — 94640 AIRWAY INHALATION TREATMENT: CPT

## 2025-04-04 PROCEDURE — 99283 EMERGENCY DEPT VISIT LOW MDM: CPT

## 2025-04-05 ENCOUNTER — APPOINTMENT (EMERGENCY)
Dept: RADIOLOGY | Facility: HOSPITAL | Age: 75
End: 2025-04-05
Payer: MEDICARE

## 2025-04-05 VITALS
DIASTOLIC BLOOD PRESSURE: 60 MMHG | HEART RATE: 95 BPM | SYSTOLIC BLOOD PRESSURE: 136 MMHG | TEMPERATURE: 97.8 F | OXYGEN SATURATION: 97 % | RESPIRATION RATE: 24 BRPM

## 2025-04-05 LAB
ALBUMIN SERPL BCG-MCNC: 4 G/DL (ref 3.5–5)
ALP SERPL-CCNC: 72 U/L (ref 34–104)
ALT SERPL W P-5'-P-CCNC: 15 U/L (ref 7–52)
ANION GAP SERPL CALCULATED.3IONS-SCNC: 7 MMOL/L (ref 4–13)
AST SERPL W P-5'-P-CCNC: 24 U/L (ref 13–39)
BASOPHILS # BLD AUTO: 0.07 THOUSANDS/ÂΜL (ref 0–0.1)
BASOPHILS NFR BLD AUTO: 1 % (ref 0–1)
BILIRUB SERPL-MCNC: 0.47 MG/DL (ref 0.2–1)
BNP SERPL-MCNC: 20 PG/ML (ref 0–100)
BUN SERPL-MCNC: 17 MG/DL (ref 5–25)
CALCIUM SERPL-MCNC: 9.1 MG/DL (ref 8.4–10.2)
CARDIAC TROPONIN I PNL SERPL HS: 3 NG/L (ref ?–50)
CHLORIDE SERPL-SCNC: 103 MMOL/L (ref 96–108)
CO2 SERPL-SCNC: 24 MMOL/L (ref 21–32)
CREAT SERPL-MCNC: 1.12 MG/DL (ref 0.6–1.3)
EOSINOPHIL # BLD AUTO: 0.89 THOUSAND/ÂΜL (ref 0–0.61)
EOSINOPHIL NFR BLD AUTO: 8 % (ref 0–6)
ERYTHROCYTE [DISTWIDTH] IN BLOOD BY AUTOMATED COUNT: 13.5 % (ref 11.6–15.1)
FLUAV AG UPPER RESP QL IA.RAPID: NEGATIVE
FLUBV AG UPPER RESP QL IA.RAPID: NEGATIVE
GFR SERPL CREATININE-BSD FRML MDRD: 49 ML/MIN/1.73SQ M
GLUCOSE SERPL-MCNC: 100 MG/DL (ref 65–140)
HCT VFR BLD AUTO: 40.6 % (ref 34.8–46.1)
HGB BLD-MCNC: 13.4 G/DL (ref 11.5–15.4)
IMM GRANULOCYTES # BLD AUTO: 0.05 THOUSAND/UL (ref 0–0.2)
IMM GRANULOCYTES NFR BLD AUTO: 0 % (ref 0–2)
LYMPHOCYTES # BLD AUTO: 2.4 THOUSANDS/ÂΜL (ref 0.6–4.47)
LYMPHOCYTES NFR BLD AUTO: 21 % (ref 14–44)
MCH RBC QN AUTO: 28.7 PG (ref 26.8–34.3)
MCHC RBC AUTO-ENTMCNC: 33 G/DL (ref 31.4–37.4)
MCV RBC AUTO: 87 FL (ref 82–98)
MONOCYTES # BLD AUTO: 1.1 THOUSAND/ÂΜL (ref 0.17–1.22)
MONOCYTES NFR BLD AUTO: 10 % (ref 4–12)
NEUTROPHILS # BLD AUTO: 7.03 THOUSANDS/ÂΜL (ref 1.85–7.62)
NEUTS SEG NFR BLD AUTO: 60 % (ref 43–75)
NRBC BLD AUTO-RTO: 0 /100 WBCS
PLATELET # BLD AUTO: 291 THOUSANDS/UL (ref 149–390)
PMV BLD AUTO: 10 FL (ref 8.9–12.7)
POTASSIUM SERPL-SCNC: 4.5 MMOL/L (ref 3.5–5.3)
PROT SERPL-MCNC: 7.5 G/DL (ref 6.4–8.4)
RBC # BLD AUTO: 4.67 MILLION/UL (ref 3.81–5.12)
SARS-COV+SARS-COV-2 AG RESP QL IA.RAPID: NEGATIVE
SODIUM SERPL-SCNC: 134 MMOL/L (ref 135–147)
WBC # BLD AUTO: 11.54 THOUSAND/UL (ref 4.31–10.16)

## 2025-04-05 PROCEDURE — 84484 ASSAY OF TROPONIN QUANT: CPT | Performed by: EMERGENCY MEDICINE

## 2025-04-05 PROCEDURE — 87811 SARS-COV-2 COVID19 W/OPTIC: CPT | Performed by: EMERGENCY MEDICINE

## 2025-04-05 PROCEDURE — 36415 COLL VENOUS BLD VENIPUNCTURE: CPT | Performed by: EMERGENCY MEDICINE

## 2025-04-05 PROCEDURE — 80053 COMPREHEN METABOLIC PANEL: CPT | Performed by: EMERGENCY MEDICINE

## 2025-04-05 PROCEDURE — 83880 ASSAY OF NATRIURETIC PEPTIDE: CPT | Performed by: EMERGENCY MEDICINE

## 2025-04-05 PROCEDURE — 85025 COMPLETE CBC W/AUTO DIFF WBC: CPT | Performed by: EMERGENCY MEDICINE

## 2025-04-05 PROCEDURE — 99285 EMERGENCY DEPT VISIT HI MDM: CPT | Performed by: EMERGENCY MEDICINE

## 2025-04-05 PROCEDURE — 87804 INFLUENZA ASSAY W/OPTIC: CPT | Performed by: EMERGENCY MEDICINE

## 2025-04-05 PROCEDURE — 96374 THER/PROPH/DIAG INJ IV PUSH: CPT

## 2025-04-05 PROCEDURE — 71045 X-RAY EXAM CHEST 1 VIEW: CPT

## 2025-04-05 PROCEDURE — 93005 ELECTROCARDIOGRAM TRACING: CPT

## 2025-04-05 RX ORDER — BENZONATATE 100 MG/1
100 CAPSULE ORAL EVERY 8 HOURS
Qty: 21 CAPSULE | Refills: 0 | Status: SHIPPED | OUTPATIENT
Start: 2025-04-05

## 2025-04-05 RX ORDER — METHYLPREDNISOLONE 4 MG/1
TABLET ORAL
Qty: 21 TABLET | Refills: 0 | Status: SHIPPED | OUTPATIENT
Start: 2025-04-05

## 2025-04-05 RX ORDER — IPRATROPIUM BROMIDE AND ALBUTEROL SULFATE 2.5; .5 MG/3ML; MG/3ML
3 SOLUTION RESPIRATORY (INHALATION) ONCE
Status: COMPLETED | OUTPATIENT
Start: 2025-04-05 | End: 2025-04-05

## 2025-04-05 RX ORDER — ALBUTEROL SULFATE 90 UG/1
2 INHALANT RESPIRATORY (INHALATION) EVERY 6 HOURS PRN
Qty: 6.7 G | Refills: 0 | Status: SHIPPED | OUTPATIENT
Start: 2025-04-05

## 2025-04-05 RX ORDER — METHYLPREDNISOLONE SODIUM SUCCINATE 125 MG/2ML
125 INJECTION, POWDER, LYOPHILIZED, FOR SOLUTION INTRAMUSCULAR; INTRAVENOUS ONCE
Status: COMPLETED | OUTPATIENT
Start: 2025-04-05 | End: 2025-04-05

## 2025-04-05 RX ORDER — BENZONATATE 100 MG/1
100 CAPSULE ORAL ONCE
Status: COMPLETED | OUTPATIENT
Start: 2025-04-05 | End: 2025-04-05

## 2025-04-05 RX ORDER — METHYLPREDNISOLONE 4 MG/1
TABLET ORAL
Qty: 21 TABLET | Refills: 0 | Status: SHIPPED | OUTPATIENT
Start: 2025-04-05 | End: 2025-04-05

## 2025-04-05 RX ORDER — ALBUTEROL SULFATE 90 UG/1
2 INHALANT RESPIRATORY (INHALATION) EVERY 6 HOURS PRN
Qty: 6.7 G | Refills: 0 | Status: SHIPPED | OUTPATIENT
Start: 2025-04-05 | End: 2025-04-05

## 2025-04-05 RX ORDER — BENZONATATE 100 MG/1
100 CAPSULE ORAL EVERY 8 HOURS
Qty: 21 CAPSULE | Refills: 0 | Status: SHIPPED | OUTPATIENT
Start: 2025-04-05 | End: 2025-04-05

## 2025-04-05 RX ADMIN — METHYLPREDNISOLONE SODIUM SUCCINATE 125 MG: 125 INJECTION, POWDER, FOR SOLUTION INTRAMUSCULAR; INTRAVENOUS at 00:45

## 2025-04-05 RX ADMIN — IPRATROPIUM BROMIDE AND ALBUTEROL SULFATE 3 ML: .5; 3 SOLUTION RESPIRATORY (INHALATION) at 00:45

## 2025-04-05 RX ADMIN — BENZONATATE 100 MG: 100 CAPSULE ORAL at 00:44

## 2025-04-05 RX ADMIN — IPRATROPIUM BROMIDE AND ALBUTEROL SULFATE 3 ML: .5; 3 SOLUTION RESPIRATORY (INHALATION) at 00:47

## 2025-04-05 NOTE — DISCHARGE INSTRUCTIONS
You were seen in the emergency department for cough and shortness of breath, your presentation is consistent with bronchitis, we have provided you with a prescription for antibiotics, albuterol, cough medication, and steroids, please pick them up at the pharmacy and take them as directed.  Please follow-up with your primary care doctor in 24 to 48 hours for reassessment.  If your symptoms worsen or persist, please return to the emergency department immediately.

## 2025-04-05 NOTE — ED PROVIDER NOTES
Time reflects when diagnosis was documented in both MDM as applicable and the Disposition within this note       Time User Action Codes Description Comment    4/5/2025 12:26 AM Elier Torres [R05.1] Acute cough     4/5/2025 12:26 AM Elier Torres [J40] Bronchitis           ED Disposition       ED Disposition   Discharge    Condition   Stable    Date/Time   Sat Apr 5, 2025  1:42 AM    Comment   Jessenia Bridges discharge to home/self care.                   Assessment & Plan       Medical Decision Making  74-year-old female presents to the emergency department with productive cough, yellow mucus, 1 weeks with subtle wheezes, differential diagnosis include acute bronchitis, community-acquired pneumonia, COPD exacerbation, asthma, congestive heart failure, ACS, will perform chest x-ray, cardiac enzymes, provide patient with breathing treatments, and reassess.    Patient doing much better after the medications, will treat for bronchitis, she will follow-up with her primary care doctor outpatient.  Strict return precautions given.  Patient understands and agrees with treatment plan.    Amount and/or Complexity of Data Reviewed  Labs: ordered.  Radiology: ordered.    Risk  Prescription drug management.             Medications   ipratropium-albuterol (DUO-NEB) 0.5-2.5 mg/3 mL inhalation solution 3 mL (3 mL Nebulization Given 4/5/25 0045)   methylPREDNISolone sodium succinate (Solu-MEDROL) injection 125 mg (125 mg Intravenous Given 4/5/25 0045)   benzonatate (TESSALON PERLES) capsule 100 mg (100 mg Oral Given 4/5/25 0044)   ipratropium-albuterol (DUO-NEB) 0.5-2.5 mg/3 mL inhalation solution 3 mL (3 mL Nebulization Given 4/5/25 0047)       ED Risk Strat Scores   HEART Risk Score      Flowsheet Row Most Recent Value   Heart Score Risk Calculator    History 0 Filed at: 04/05/2025 0033   ECG 0 Filed at: 04/05/2025 0033   Age 2 Filed at: 04/05/2025 0033   Risk Factors 1 Filed at: 04/05/2025 0033   Troponin 0 Filed at:  04/05/2025 0033   HEART Score 3 Filed at: 04/05/2025 0033          HEART Risk Score      Flowsheet Row Most Recent Value   Heart Score Risk Calculator    History 0 Filed at: 04/05/2025 0033   ECG 0 Filed at: 04/05/2025 0033   Age 2 Filed at: 04/05/2025 0033   Risk Factors 1 Filed at: 04/05/2025 0033   Troponin 0 Filed at: 04/05/2025 0033   HEART Score 3 Filed at: 04/05/2025 0033                                                  History of Present Illness       Chief Complaint   Patient presents with    Cough     Pt reports cold symptoms yesterday. States today she woke up with a productive cough and SOB       Past Medical History:   Diagnosis Date    GERD (gastroesophageal reflux disease)     Hypertension       Past Surgical History:   Procedure Laterality Date    CHOLECYSTECTOMY        Family History   Problem Relation Age of Onset    Stroke Mother       Social History     Tobacco Use    Smoking status: Never    Smokeless tobacco: Never   Vaping Use    Vaping status: Never Used   Substance Use Topics    Alcohol use: Never    Drug use: Never      E-Cigarette/Vaping    E-Cigarette Use Never User       E-Cigarette/Vaping Substances      I have reviewed and agree with the history as documented.     74-year-old female with past medical history listed below presents to the emergency department with complaint of cough, productive of yellow mucus.  Patient states that this has been going on for the past week,  was worried about her because she looked a little bit winded when walking.  She has had no chills, fevers, she denies any hemoptysis, chest pain.  Patient has no shortness of breath at this time.  Patient had subtle wheezes on auscultation of lungs, she denies any GI or  complaints.      Cough  Associated symptoms: shortness of breath    Associated symptoms: no chest pain, no chills, no ear pain, no fever, no rash and no sore throat        Review of Systems   Constitutional:  Negative for chills and fever.    HENT:  Negative for ear pain and sore throat.    Eyes:  Negative for pain and visual disturbance.   Respiratory:  Positive for cough and shortness of breath.    Cardiovascular:  Negative for chest pain and palpitations.   Gastrointestinal:  Negative for abdominal pain and vomiting.   Genitourinary:  Negative for dysuria and hematuria.   Musculoskeletal:  Negative for arthralgias and back pain.   Skin:  Negative for color change and rash.   Neurological:  Negative for seizures and syncope.   All other systems reviewed and are negative.          Objective       ED Triage Vitals [04/05/25 0000]   Temperature Pulse Blood Pressure Respirations SpO2 Patient Position - Orthostatic VS   97.8 °F (36.6 °C) 93 (!) 174/89 22 97 % Sitting      Temp Source Heart Rate Source BP Location FiO2 (%) Pain Score    Temporal Monitor Right arm -- No Pain      Vitals      Date and Time Temp Pulse SpO2 Resp BP Pain Score FACES Pain Rating User   04/05/25 0100 -- 95 97 % 24 136/60 -- --    04/05/25 0000 97.8 °F (36.6 °C) 93 97 % 22 174/89 No Pain --             Physical Exam  Vitals and nursing note reviewed.   Constitutional:       General: She is not in acute distress.     Appearance: She is well-developed.   HENT:      Head: Normocephalic and atraumatic.   Eyes:      Conjunctiva/sclera: Conjunctivae normal.   Cardiovascular:      Rate and Rhythm: Normal rate and regular rhythm.      Heart sounds: No murmur heard.  Pulmonary:      Effort: Pulmonary effort is normal. No respiratory distress.      Breath sounds: Wheezing present.   Abdominal:      Palpations: Abdomen is soft.      Tenderness: There is no abdominal tenderness.   Musculoskeletal:         General: No swelling.      Cervical back: Neck supple.   Skin:     General: Skin is warm and dry.      Capillary Refill: Capillary refill takes less than 2 seconds.   Neurological:      Mental Status: She is alert.   Psychiatric:         Mood and Affect: Mood normal.         Results  Reviewed       Procedure Component Value Units Date/Time    Comprehensive metabolic panel [[id number]]  (Abnormal) Collected: 04/05/25 0044    Lab Status: Final result Specimen: Blood from Arm, Right Updated: 04/05/25 0135     Sodium 134 mmol/L      Potassium 4.5 mmol/L      Chloride 103 mmol/L      CO2 24 mmol/L      ANION GAP 7 mmol/L      BUN 17 mg/dL      Creatinine 1.12 mg/dL      Glucose 100 mg/dL      Calcium 9.1 mg/dL      AST 24 U/L      ALT 15 U/L      Alkaline Phosphatase 72 U/L      Total Protein 7.5 g/dL      Albumin 4.0 g/dL      Total Bilirubin 0.47 mg/dL      eGFR 49 ml/min/1.73sq m     Narrative:      National Kidney Disease Foundation guidelines for Chronic Kidney Disease (CKD):     Stage 1 with normal or high GFR (GFR > 90 mL/min/1.73 square meters)    Stage 2 Mild CKD (GFR = 60-89 mL/min/1.73 square meters)    Stage 3A Moderate CKD (GFR = 45-59 mL/min/1.73 square meters)    Stage 3B Moderate CKD (GFR = 30-44 mL/min/1.73 square meters)    Stage 4 Severe CKD (GFR = 15-29 mL/min/1.73 square meters)    Stage 5 End Stage CKD (GFR <15 mL/min/1.73 square meters)  Note: GFR calculation is accurate only with a steady state creatinine    B-Type Natriuretic Peptide(BNP) [[id number]]  (Normal) Collected: 04/05/25 0044    Lab Status: Final result Specimen: Blood from Arm, Right Updated: 04/05/25 0130     BNP 20 pg/mL     HS Troponin 0hr (reflex protocol) [[id number]]  (Normal) Collected: 04/05/25 0044    Lab Status: Final result Specimen: Blood from Arm, Right Updated: 04/05/25 0120     hs TnI 0hr 3 ng/L     FLU/COVID Rapid Antigen (30 min. TAT) - Preferred screening test in ED [[id number]]  (Normal) Collected: 04/05/25 0044    Lab Status: Final result Specimen: Nares from Nose Updated: 04/05/25 0118     SARS COV Rapid Antigen Negative     Influenza A Rapid Antigen Negative     Influenza B Rapid Antigen Negative    Narrative:      This test has been performed using the Shypidel Laura 2 FLU+SARS Antigen test under  the Emergency Use Authorization (EUA). This test has been validated by the  and verified by the performing laboratory. The Laura uses lateral flow immunofluorescent sandwich assay to detect SARS-COV, Influenza A and Influenza B Antigen.     The [employer] STUDI[employer]idel Laura 2 SARS Antigen test does not differentiate between SARS-CoV and SARS-CoV-2.     Negative results are presumptive and may be confirmed with a molecular assay, if necessary, for patient management. Negative results do not rule out SARS-CoV-2 or influenza infection and should not be used as the sole basis for treatment or patient management decisions. A negative test result may occur if the level of antigen in a sample is below the limit of detection of this test.     Positive results are indicative of the presence of viral antigens, but do not rule out bacterial infection or co-infection with other viruses.     All test results should be used as an adjunct to clinical observations and other information available to the provider.    FOR PEDIATRIC PATIENTS - copy/paste COVID Guidelines URL to browser: [email or web address]    CBC and differential [[id number]]  (Abnormal) Collected: 04/05/25 0044    Lab Status: Final result Specimen: Blood from Arm, Right Updated: 04/05/25 0058     WBC 11.54 Thousand/uL      RBC 4.67 Million/uL      Hemoglobin 13.4 g/dL      Hematocrit 40.6 %      MCV 87 fL      MCH 28.7 pg      MCHC 33.0 g/dL      RDW 13.5 %      MPV 10.0 fL      Platelets 291 Thousands/uL      nRBC 0 /100 WBCs      Segmented % 60 %      Immature Grans % 0 %      Lymphocytes % 21 %      Monocytes % 10 %      Eosinophils Relative 8 %      Basophils Relative 1 %      Absolute Neutrophils 7.03 Thousands/µL      Absolute Immature Grans 0.05 Thousand/uL      Absolute Lymphocytes 2.40 Thousands/µL      Absolute Monocytes 1.10 Thousand/µL      Eosinophils Absolute 0.89 Thousand/µL      Basophils Absolute 0.07  Thousands/µL             X-ray chest 1 view portable    (Results Pending)       Procedures    ED Medication and Procedure Management   Prior to Admission Medications   Prescriptions Last Dose Informant Patient Reported? Taking?   losartan (COZAAR) 25 mg tablet   Yes No   Sig: Take 1 tablet by mouth in the morning   omeprazole (PriLOSEC) 20 mg delayed release capsule   Yes No   Sig: Take 20 mg by mouth daily      Facility-Administered Medications: None     Discharge Medication List as of 4/5/2025  1:43 AM        START taking these medications    Details   albuterol (Proventil HFA) 90 mcg/act inhaler Inhale 2 puffs every 6 (six) hours as needed for wheezing, Starting Sat 4/5/2025, Normal      amoxicillin-clavulanate (AUGMENTIN) 875-125 mg per tablet Take 1 tablet by mouth every 12 (twelve) hours for 7 days, Starting Sat 4/5/2025, Until Sat 4/12/2025, Normal      benzonatate (TESSALON PERLES) 100 mg capsule Take 1 capsule (100 mg total) by mouth every 8 (eight) hours, Starting Sat 4/5/2025, Normal      methylPREDNISolone 4 MG tablet therapy pack Use as directed on package, Normal           CONTINUE these medications which have NOT CHANGED    Details   losartan (COZAAR) 25 mg tablet Take 1 tablet by mouth in the morning, Starting Sat 10/26/2024, Historical Med      omeprazole (PriLOSEC) 20 mg delayed release capsule Take 20 mg by mouth daily, Historical Med           No discharge procedures on file.  ED SEPSIS DOCUMENTATION   Time reflects when diagnosis was documented in both MDM as applicable and the Disposition within this note       Time User Action Codes Description Comment    4/5/2025 12:26 AM Elier Torres [R05.1] Acute cough     4/5/2025 12:26 AM Elier Torres [J40] Bronchitis                  Elier Torres,   04/05/25 0433

## 2025-04-07 LAB
ATRIAL RATE: 88 BPM
P AXIS: 42 DEGREES
PR INTERVAL: 154 MS
QRS AXIS: 6 DEGREES
QRSD INTERVAL: 94 MS
QT INTERVAL: 374 MS
QTC INTERVAL: 452 MS
T WAVE AXIS: 45 DEGREES
VENTRICULAR RATE: 88 BPM

## 2025-04-07 PROCEDURE — 93010 ELECTROCARDIOGRAM REPORT: CPT | Performed by: INTERNAL MEDICINE

## 2025-05-09 DIAGNOSIS — K21.9 GASTROESOPHAGEAL REFLUX DISEASE, UNSPECIFIED WHETHER ESOPHAGITIS PRESENT: Primary | ICD-10-CM

## 2025-05-09 NOTE — TELEPHONE ENCOUNTER
Reason for call:   [x] Refill   [] Prior Auth  [] Other:     Office:   [x] PCP/Provider -   [] Specialty/Provider -     Medication: Omeprazole    Dose/Frequency: 20 mg    Quantity:     Pharmacy: [id number] - [place] - [address] [facility]   Does the patient have enough for 3 days?   [x] Yes   [] No - Send as HP to POD    Mail Away Pharmacy   Does the patient have enough for 10 days?   [] Yes   [] No - Send as HP to POD

## 2025-05-12 RX ORDER — OMEPRAZOLE 20 MG/1
20 CAPSULE, DELAYED RELEASE ORAL DAILY
Qty: 90 CAPSULE | Refills: 1 | Status: SHIPPED | OUTPATIENT
Start: 2025-05-12

## 2025-05-15 RX ORDER — FLUTICASONE PROPIONATE AND SALMETEROL 100; 50 UG/1; UG/1
1 POWDER RESPIRATORY (INHALATION) 2 TIMES DAILY
COMMUNITY
Start: 2025-05-05

## 2025-05-27 ENCOUNTER — OFFICE VISIT (OUTPATIENT)
Dept: PULMONOLOGY | Facility: CLINIC | Age: 75
End: 2025-05-27
Payer: MEDICARE

## 2025-05-27 VITALS
TEMPERATURE: 98.2 F | WEIGHT: 223 LBS | BODY MASS INDEX: 39.51 KG/M2 | SYSTOLIC BLOOD PRESSURE: 160 MMHG | DIASTOLIC BLOOD PRESSURE: 88 MMHG | HEART RATE: 94 BPM | HEIGHT: 63 IN | OXYGEN SATURATION: 97 %

## 2025-05-27 DIAGNOSIS — R05.3 CHRONIC COUGH: Primary | ICD-10-CM

## 2025-05-27 DIAGNOSIS — R29.818 SUSPECTED SLEEP APNEA: ICD-10-CM

## 2025-05-27 DIAGNOSIS — K21.9 GASTROESOPHAGEAL REFLUX DISEASE WITHOUT ESOPHAGITIS: ICD-10-CM

## 2025-05-27 PROCEDURE — 99213 OFFICE O/P EST LOW 20 MIN: CPT | Performed by: NURSE PRACTITIONER

## 2025-05-27 PROCEDURE — G2211 COMPLEX E/M VISIT ADD ON: HCPCS | Performed by: NURSE PRACTITIONER

## 2025-05-27 NOTE — ASSESSMENT & PLAN NOTE
+ Chronic loud snoring, witnessed apnea and daytime sleepiness  She is willing to have a sleep study and continue a conversation about CPAP if needed    Orders:    Ambulatory Referral to Sleep Medicine; Future

## 2025-05-27 NOTE — PROGRESS NOTES
Follow-up  Visit - Pulmonary Medicine   Name: Jessenia Bridges      : 1950      MRN: [id number]  Encounter Provider: BETZAIDA Mcgovern  Encounter Date: 2025   Encounter department: [facility] [place]  :  Assessment & Plan  Chronic cough  Subjectively cough improved in the past when she was on Advair as well as increased dose of PPI. She has had one recent episode of bronchitis since the last visit, treated by ER. Given her history of improvement on inhaler, as well as prior intermediate inflammation noted on FeNO testing, will obtain PFT. I will call with results.    Lungs today are clear and symptoms controlled with Advair 1 puff daily  On the topic of affordability I have asked her to inquire with insurance on what is formulary / affordable (possibly Breo?). We may end up sending to [facility] pharmacy in the future.    Orders:    Complete PFT with post bronchodilator; Future    Suspected sleep apnea  + Chronic loud snoring, witnessed apnea and daytime sleepiness  She is willing to have a sleep study and continue a conversation about CPAP if needed    Orders:    Ambulatory Referral to Sleep Medicine; Future    Gastroesophageal reflux disease without esophagitis  Symptoms are stable on 20mg Prilosec daily           Return in about 6 months (around 2025).    History of Present Illness   Jessenia Bridges is a 75 y.o. female who presents for follow up. Since her last visit she had an ER visit 25 for acute cough and she was prescribed medrol dose pack as well as Augmentin for that episode of bronchitis.    She has only been taking her Advair once per day. Without the Advair she will have increased wheezing and cough but symptoms are usually well controlled now with one inhalation per day unless she becomes sick.      Review of Systems  Please note that a 14-point review of systems was performed to include Constitutional, HEENT, Respiratory, CVS, GI, ,  "Musculoskeletal, Integumentary, Neurologic, Rheumatologic, Endocrinologic, Psychiatric, Lymphatic, and Hematologic/Oncologic systems were reviewed and are negative unless otherwise stated in HPI. Positive and negative findings pertinent to this evaluation are incorporated into the history of present illness.       Medications Ordered Prior to Encounter[1]   Social History[2]     Medical History Reviewed by provider this encounter:  Tobacco  Allergies  Meds  Problems  Med Hx  Surg Hx  Fam Hx     .    Objective   /88 (BP Location: Left arm, Patient Position: Sitting, Cuff Size: Standard)   Pulse 94   Temp 98.2 °F (36.8 °C) (Temporal)   Ht 5' 3\" (1.6 m)   Wt 101 kg (223 lb)   SpO2 97%   BMI 39.50 kg/m²     Physical Exam  Vitals reviewed.   Constitutional:       Appearance: Normal appearance.   HENT:      Head: Normocephalic.      Nose: Nose normal.      Mouth/Throat:      Mouth: Mucous membranes are moist.      Pharynx: Oropharynx is clear.     Cardiovascular:      Rate and Rhythm: Normal rate and regular rhythm.      Pulses: Normal pulses.      Heart sounds: Normal heart sounds.   Pulmonary:      Effort: Pulmonary effort is normal.      Breath sounds: Normal breath sounds.     Musculoskeletal:         General: Normal range of motion.     Skin:     General: Skin is warm.      Capillary Refill: Capillary refill takes less than 2 seconds.     Neurological:      General: No focal deficit present.      Mental Status: She is alert and oriented to person, place, and time.     Psychiatric:         Mood and Affect: Mood normal.         Behavior: Behavior normal.         Diagnostic Data:  Labs: I personally reviewed the most recent laboratory data pertinent to today's visit.  Lab Results   Component Value Date    WBC 11.54 (H) 04/05/2025    HGB 13.4 04/05/2025    HCT 40.6 04/05/2025    MCV 87 04/05/2025     04/05/2025    EOSPCT 8 (H) 04/05/2025    EOSABS 0.89 (H) 04/05/2025    MONOPCT 10 04/05/2025 " "    Lab Results   Component Value Date    CALCIUM 9.1 04/05/2025    K 4.5 04/05/2025    CO2 24 04/05/2025     04/05/2025    BUN 17 04/05/2025    CREATININE 1.12 04/05/2025     No results found for: \"IGE\", \"RAST\"  Lab Results   Component Value Date    ALT 15 04/05/2025    AST 24 04/05/2025    ALKPHOS 72 04/05/2025         Radiology results:  Radiology Results Review: I have reviewed radiology reports from PACS including: chest xray.  CXR 4/5/25: No acute cardiopulmonary disease. Small hiatal hernia.        BETZAIDA Mcgovern           [1]   Current Outpatient Medications on File Prior to Visit   Medication Sig Dispense Refill    Fluticasone-Salmeterol (Advair) 100-50 mcg/dose inhaler Inhale 1 puff in the morning Rinse mouth after use      losartan (COZAAR) 25 mg tablet Take 1 tablet by mouth in the morning      omeprazole (PriLOSEC) 20 mg delayed release capsule Take 1 capsule (20 mg total) by mouth daily 90 capsule 1    albuterol (Proventil HFA) 90 mcg/act inhaler Inhale 2 puffs every 6 (six) hours as needed for wheezing (Patient not taking: Reported on 5/27/2025) 6.7 g 0    [DISCONTINUED] benzonatate (TESSALON PERLES) 100 mg capsule Take 1 capsule (100 mg total) by mouth every 8 (eight) hours (Patient not taking: Reported on 5/27/2025) 21 capsule 0    [DISCONTINUED] methylPREDNISolone 4 MG tablet therapy pack Use as directed on package (Patient not taking: Reported on 5/27/2025) 21 tablet 0     No current facility-administered medications on file prior to visit.   [2]   Social History  Tobacco Use    Smoking status: Never    Smokeless tobacco: Never   Vaping Use    Vaping status: Never Used   Substance and Sexual Activity    Alcohol use: Never    Drug use: Never    Sexual activity: Not Currently     "

## 2025-05-27 NOTE — ASSESSMENT & PLAN NOTE
Subjectively cough improved in the past when she was on Advair as well as increased dose of PPI. She has had one recent episode of bronchitis since the last visit, treated by ER. Given her history of improvement on inhaler, as well as prior intermediate inflammation noted on FeNO testing, will obtain PFT. I will call with results.    Lungs today are clear and symptoms controlled with Advair 1 puff daily  On the topic of affordability I have asked her to inquire with insurance on what is formulary / affordable (possibly Breo?). We may end up sending to [facility] in the future.    Orders:    Complete PFT with post bronchodilator; Future

## 2025-05-27 NOTE — PATIENT INSTRUCTIONS
Patient to call insurance company for a price on a covered inhaler ICS/LABA such as:  Breo Ellipta  Advair HFA  Symbicort  Breyna

## 2025-05-28 ENCOUNTER — TRANSCRIBE ORDERS (OUTPATIENT)
Dept: SLEEP CENTER | Facility: CLINIC | Age: 75
End: 2025-05-28

## 2025-05-28 DIAGNOSIS — G47.8 OTHER SLEEP DISORDERS: Primary | ICD-10-CM

## 2025-05-28 DIAGNOSIS — R29.818 SUSPECTED SLEEP APNEA: ICD-10-CM

## 2025-06-02 ENCOUNTER — HOSPITAL ENCOUNTER (OUTPATIENT)
Dept: PULMONOLOGY | Facility: HOSPITAL | Age: 75
Discharge: HOME | End: 2025-06-02
Attending: NURSE PRACTITIONER
Payer: MEDICARE

## 2025-06-02 DIAGNOSIS — R05.3 CHRONIC COUGH: ICD-10-CM

## 2025-06-02 PROCEDURE — 94060 EVALUATION OF WHEEZING: CPT

## 2025-06-02 PROCEDURE — 94760 N-INVAS EAR/PLS OXIMETRY 1: CPT

## 2025-06-02 PROCEDURE — 94060 EVALUATION OF WHEEZING: CPT | Performed by: INTERNAL MEDICINE

## 2025-06-02 PROCEDURE — 94726 PLETHYSMOGRAPHY LUNG VOLUMES: CPT

## 2025-06-02 PROCEDURE — 94729 DIFFUSING CAPACITY: CPT

## 2025-06-02 PROCEDURE — 94729 DIFFUSING CAPACITY: CPT | Performed by: INTERNAL MEDICINE

## 2025-06-02 PROCEDURE — 94726 PLETHYSMOGRAPHY LUNG VOLUMES: CPT | Performed by: INTERNAL MEDICINE

## 2025-06-02 RX ORDER — ALBUTEROL SULFATE 0.83 MG/ML
2.5 SOLUTION RESPIRATORY (INHALATION) ONCE AS NEEDED
Status: COMPLETED | OUTPATIENT
Start: 2025-06-02 | End: 2025-06-02

## 2025-06-02 RX ADMIN — ALBUTEROL SULFATE 2.5 MG: 2.5 SOLUTION RESPIRATORY (INHALATION) at 09:28

## 2025-06-06 ENCOUNTER — RESULTS FOLLOW-UP (OUTPATIENT)
Age: 75
End: 2025-06-06

## 2025-06-10 NOTE — TELEPHONE ENCOUNTER
----- Message from BETZAIDA Mcgovern sent at 6/10/2025 11:20 AM EDT -----  Do we have a letter to send her to indicate PFT was normal  Pod has been unable to reach her  ----- Message -----  From: Tiffani Shah LPN  Sent: 6/10/2025  10:38 AM EDT  To: BETZAIDA Mcgovern    We have tried reaching out to pt 3 times regarding results. She does not have mychart where we can send a message may need to send a letter to pt as no follow up is scheduled either.   ----- Message -----  From: BETZAIDA Mcgovern  Sent: 6/6/2025   3:35 PM EDT  To: Pulmonology [facility]    Please let Jessenia know her PFT was basically normal. So long as she is doing well with Advair 1 puff every day there is no need to change the plan we discussed at her last visit. I will see her again   in November.  Thanks  ----- Message -----  From: Vickie Moore DO  Sent: 6/5/2025   3:45 PM EDT  To: BETZAIDA Mcgovern